# Patient Record
Sex: FEMALE
[De-identification: names, ages, dates, MRNs, and addresses within clinical notes are randomized per-mention and may not be internally consistent; named-entity substitution may affect disease eponyms.]

---

## 2017-02-06 ENCOUNTER — RX ONLY (OUTPATIENT)
Age: 32
Setting detail: RX ONLY
End: 2017-02-06

## 2017-02-06 RX ORDER — TRIAMCINOLONE ACETONIDE 0.25 MG/G
CREAM TOPICAL
Qty: 1 | Refills: 1 | Status: ERX

## 2017-10-17 ENCOUNTER — RX ONLY (OUTPATIENT)
Age: 32
Setting detail: RX ONLY
End: 2017-10-17

## 2017-10-17 RX ORDER — VALACYCLOVIR 500 MG/1
1 TABLET TABLET ORAL
Qty: 30 | Refills: 11 | Status: ERX

## 2017-11-22 ENCOUNTER — RX ONLY (OUTPATIENT)
Age: 32
Setting detail: RX ONLY
End: 2017-11-22

## 2017-11-22 RX ORDER — TRIAMCINOLONE ACETONIDE 0.25 MG/G
CREAM TOPICAL
Qty: 1 | Refills: 1 | Status: ERX

## 2018-04-23 ENCOUNTER — RX ONLY (OUTPATIENT)
Age: 33
Setting detail: RX ONLY
End: 2018-04-23

## 2018-04-23 RX ORDER — FLUCONAZOLE 150 MG/1
TABLET ORAL
Qty: 1 | Refills: 0 | Status: ERX | COMMUNITY
Start: 2018-04-23

## 2018-07-20 ENCOUNTER — RX ONLY (OUTPATIENT)
Age: 33
Setting detail: RX ONLY
End: 2018-07-20

## 2018-07-20 RX ORDER — VALACYCLOVIR 500 MG/1
TAKE ONE TABLET TABLET ORAL
Qty: 30 | Refills: 10 | Status: ERX

## 2018-11-28 ENCOUNTER — RX ONLY (OUTPATIENT)
Age: 33
Setting detail: RX ONLY
End: 2018-11-28

## 2018-11-28 RX ORDER — GLYCOPYRROLATE 1 MG/1
TABLET ORAL
Qty: 60 | Refills: 4 | Status: ERX | COMMUNITY
Start: 2018-11-28

## 2018-12-11 ENCOUNTER — RX ONLY (OUTPATIENT)
Age: 33
Setting detail: RX ONLY
End: 2018-12-11

## 2018-12-11 RX ORDER — NORGESTIMATE AND ETHINYL ESTRADIOL 7DAYSX3 LO
KIT ORAL
Qty: 84 | Refills: 3 | Status: ERX | COMMUNITY
Start: 2018-12-11

## 2019-02-27 ENCOUNTER — RX ONLY (OUTPATIENT)
Age: 34
Setting detail: RX ONLY
End: 2019-02-27

## 2019-02-27 RX ORDER — NORGESTIMATE AND ETHINYL ESTRADIOL 7DAYSX3 LO
KIT ORAL
Qty: 84 | Refills: 3 | Status: ERX | COMMUNITY
Start: 2019-02-27

## 2019-05-15 ENCOUNTER — RX ONLY (OUTPATIENT)
Age: 34
Setting detail: RX ONLY
End: 2019-05-15

## 2019-05-15 RX ORDER — FLUCONAZOLE 150 MG/1
TABLET ORAL
Qty: 1 | Refills: 1 | Status: ERX | COMMUNITY
Start: 2019-05-15

## 2019-07-13 ENCOUNTER — RX ONLY (OUTPATIENT)
Age: 34
Setting detail: RX ONLY
End: 2019-07-13

## 2019-07-13 RX ORDER — SPIRONOLACTONE 25 MG/1
TABLET, FILM COATED ORAL
Qty: 180 | Refills: 3 | Status: ERX | COMMUNITY
Start: 2019-07-13

## 2019-08-16 ENCOUNTER — RX ONLY (OUTPATIENT)
Age: 34
Setting detail: RX ONLY
End: 2019-08-16

## 2019-08-16 RX ORDER — FLUCONAZOLE 150 MG/1
TABLET ORAL
Qty: 1 | Refills: 1 | Status: ERX

## 2019-09-06 ENCOUNTER — RX ONLY (OUTPATIENT)
Age: 34
Setting detail: RX ONLY
End: 2019-09-06

## 2019-09-06 RX ORDER — VALACYCLOVIR 500 MG/1
TABLET ORAL
Qty: 30 | Refills: 11 | Status: ERX | COMMUNITY
Start: 2019-09-06

## 2019-11-29 ENCOUNTER — RX ONLY (OUTPATIENT)
Age: 34
Setting detail: RX ONLY
End: 2019-11-29

## 2019-11-29 RX ORDER — BIMATOPROST 0.3 MG/ML
SOLUTION/ DROPS OPHTHALMIC
Qty: 1 | Refills: 3 | Status: ERX | COMMUNITY
Start: 2019-11-29

## 2019-12-03 ENCOUNTER — RX ONLY (OUTPATIENT)
Age: 34
Setting detail: RX ONLY
End: 2019-12-03

## 2019-12-06 ENCOUNTER — RX ONLY (OUTPATIENT)
Age: 34
Setting detail: RX ONLY
End: 2019-12-06

## 2020-01-20 ENCOUNTER — APPOINTMENT (RX ONLY)
Dept: URBAN - METROPOLITAN AREA CLINIC 303 | Facility: CLINIC | Age: 35
Setting detail: DERMATOLOGY
End: 2020-01-20

## 2020-01-20 DIAGNOSIS — Z41.9 ENCOUNTER FOR PROCEDURE FOR PURPOSES OTHER THAN REMEDYING HEALTH STATE, UNSPECIFIED: ICD-10-CM

## 2020-01-20 PROCEDURE — ? FILLERS

## 2020-01-20 NOTE — PROCEDURE: FILLERS
Include Cannula Length?: 1.5 inch I have reviewed and confirmed nurses' notes for patient's medications, allergies, medical history, and surgical history.

## 2020-01-20 NOTE — PROCEDURE: FILLERS
Post-Care Instructions: Patient did well, very minimal swelling and no bruising noted post procedure.  Patient instructed to apply ice to reduce swelling, continue arnica, and avoid blood thinners listed in treatment guidelines.  To call me if she has any concerns.  Post HA filler instructions provided.  To f/u in 2 weeks.

## 2020-02-05 ENCOUNTER — RX ONLY (OUTPATIENT)
Age: 35
Setting detail: RX ONLY
End: 2020-02-05

## 2020-02-05 ENCOUNTER — APPOINTMENT (RX ONLY)
Dept: URBAN - METROPOLITAN AREA CLINIC 303 | Facility: CLINIC | Age: 35
Setting detail: DERMATOLOGY
End: 2020-02-05

## 2020-02-05 DIAGNOSIS — Z41.9 ENCOUNTER FOR PROCEDURE FOR PURPOSES OTHER THAN REMEDYING HEALTH STATE, UNSPECIFIED: ICD-10-CM

## 2020-02-05 ASSESSMENT — LOCATION SIMPLE DESCRIPTION DERM: LOCATION SIMPLE: LEFT CHEEK

## 2020-02-05 ASSESSMENT — LOCATION ZONE DERM: LOCATION ZONE: FACE

## 2020-02-05 ASSESSMENT — LOCATION DETAILED DESCRIPTION DERM: LOCATION DETAILED: LEFT MEDIAL MALAR CHEEK

## 2020-02-19 ENCOUNTER — RX ONLY (OUTPATIENT)
Age: 35
Setting detail: RX ONLY
End: 2020-02-19

## 2020-05-13 ENCOUNTER — RX ONLY (OUTPATIENT)
Age: 35
Setting detail: RX ONLY
End: 2020-05-13

## 2020-06-10 ENCOUNTER — RX ONLY (OUTPATIENT)
Age: 35
Setting detail: RX ONLY
End: 2020-06-10

## 2020-07-15 ENCOUNTER — RX ONLY (OUTPATIENT)
Age: 35
Setting detail: RX ONLY
End: 2020-07-15

## 2020-07-15 RX ORDER — FLUCONAZOLE 150 MG/1
TABLET ORAL
Qty: 1 | Refills: 1 | Status: ERX | COMMUNITY
Start: 2020-07-15

## 2020-07-15 RX ORDER — METRONIDAZOLE 500 MG/1
TABLET ORAL
Qty: 14 | Refills: 0 | Status: ERX | COMMUNITY
Start: 2020-07-15

## 2020-08-07 ENCOUNTER — APPOINTMENT (RX ONLY)
Dept: URBAN - METROPOLITAN AREA CLINIC 295 | Facility: CLINIC | Age: 35
Setting detail: DERMATOLOGY
End: 2020-08-07

## 2020-08-07 PROBLEM — Z01.84 ENCOUNTER FOR ANTIBODY RESPONSE EXAMINATION: Status: ACTIVE | Noted: 2020-08-07

## 2020-08-07 PROCEDURE — ? RAPID COVID-19 TEST

## 2020-08-07 PROCEDURE — U0002 COVID-19 LAB TEST NON-CDC: HCPCS

## 2020-08-07 NOTE — PROCEDURE: RAPID COVID-19 TEST
Test Performance Details: SARS-CoV antibody IgM and IgG are NEGATIVE.  This sample does not contain detectable SARS-CoV-2 IgM or IgG antibodies.  This negative result wade not rule out SARS-CoV-2 infection.  Correlation with epidemiologic risk factors and other clinical laboratory findings is recommended.  Serologic results should not be used as the sole basis to diagnose or exclude recent SARS-CoV-2 infection.

## 2020-08-07 NOTE — PROCEDURE: RAPID COVID-19 TEST
Comments: *This test has been validated and developed by [employer].  The FDA has been notified.\\n*This test has not yet been reviewed nor approved by the FDA.\\n*This test is authorized by FDA under an Emergency Use Authorization (EUA).  This test is only authorized for the duration of the declaration that circumstances exist justifying the authorization of emergency use of in vitro diagnostics for detection and/or diagnosis of COVID-19 under Section 564(b)(1) of the Act, 21 U.S.C. 360bbb-3 (b)(1), unless the authorization is terminated or revoked sooner.  This test has been authorized only for detecting the presence of antibodies against SARS-Cov-2, not for any other viruses or pathogens.\\n*Negative antibody result does not rule out SARS-CoV-2 infection.\\n*Follow-up testing with a molecular diagnostic test should be considered to rule out infection.\\n*This antibody test result should not be used as the sold basis to diagnose or exclude SARS-CoV-2 infection or to inform infection status.\\n Comments: *This test has been validated and developed by [employer].  The FDA has been notified.\\n*This test has not yet been reviewed nor approved by the FDA.\\n*This test is authorized by FDA under an Emergency Use Authorization (EUA).  This test is only authorized for the duration of the declaration that circumstances exist justifying the authorization of emergency use of in vitro diagnostics for detection and/or diagnosis of COVID-19 under Section 564(b)(1) of the Act, 21 U.S.C. 360bbb-3 (b)(1), unless the authorization is terminated or revoked sooner.  This test has been authorized only for detecting the presence of antibodies against SARS-Cov-2, not for any other viruses or pathogens.\\n*Negative antibody result does not rule out SARS-CoV-2 infection.\\n*Follow-up testing with a molecular diagnostic test should be considered to rule out infection.\\n*This antibody test result should not be used as the sold basis to diagnose or exclude SARS-CoV-2 infection or to inform infection status.\\n

## 2020-10-07 ENCOUNTER — RX ONLY (OUTPATIENT)
Age: 35
Setting detail: RX ONLY
End: 2020-10-07

## 2020-10-07 RX ORDER — METRONIDAZOLE 500 MG/1
TABLET ORAL
Qty: 14 | Refills: 0 | Status: ERX

## 2020-10-07 RX ORDER — FLUCONAZOLE 150 MG/1
TABLET ORAL
Qty: 1 | Refills: 1 | Status: ERX

## 2020-10-23 ENCOUNTER — RX ONLY (OUTPATIENT)
Age: 35
Setting detail: RX ONLY
End: 2020-10-23

## 2020-10-23 RX ORDER — VALACYCLOVIR 500 MG/1
TABLET, FILM COATED ORAL
Qty: 30 | Refills: 11 | Status: ERX | COMMUNITY
Start: 2020-10-23

## 2020-10-23 RX ORDER — METRONIDAZOLE 500 MG/1
TABLET ORAL
Qty: 14 | Refills: 0 | Status: ERX

## 2020-10-23 RX ORDER — GLYCOPYRROLATE 1 MG/1
TABLET ORAL
Qty: 60 | Refills: 4 | Status: ERX | COMMUNITY
Start: 2020-10-23

## 2020-10-23 RX ORDER — SPIRONOLACTONE 25 MG/1
TABLET, FILM COATED ORAL
Qty: 180 | Refills: 3 | Status: ERX

## 2020-12-22 ENCOUNTER — RX ONLY (OUTPATIENT)
Age: 35
Setting detail: RX ONLY
End: 2020-12-22

## 2020-12-22 RX ORDER — METRONIDAZOLE 500 MG/1
TABLET ORAL
Qty: 14 | Refills: 0 | Status: ERX

## 2021-01-13 ENCOUNTER — RX ONLY (OUTPATIENT)
Age: 36
Setting detail: RX ONLY
End: 2021-01-13

## 2021-01-13 ENCOUNTER — APPOINTMENT (RX ONLY)
Dept: URBAN - METROPOLITAN AREA CLINIC 295 | Facility: CLINIC | Age: 36
Setting detail: DERMATOLOGY
End: 2021-01-13

## 2021-01-13 RX ORDER — DROSPIRENONE AND ETHINYL ESTRADIOL 0.02-3(28)
KIT ORAL
Qty: 84 | Refills: 3 | Status: ERX | COMMUNITY
Start: 2021-01-13

## 2021-02-15 ENCOUNTER — RX ONLY (OUTPATIENT)
Age: 36
Setting detail: RX ONLY
End: 2021-02-15

## 2021-02-15 RX ORDER — METRONIDAZOLE 500 MG/1
TABLET ORAL
Qty: 14 | Refills: 0 | Status: ERX

## 2021-02-16 ENCOUNTER — RX ONLY (OUTPATIENT)
Age: 36
Setting detail: RX ONLY
End: 2021-02-16

## 2021-02-16 RX ORDER — GLYCOPYRROLATE 1 MG/1
TABLET ORAL
Qty: 180 | Refills: 1 | Status: ERX

## 2021-02-24 ENCOUNTER — RX ONLY (OUTPATIENT)
Age: 36
Setting detail: RX ONLY
End: 2021-02-24

## 2021-02-24 RX ORDER — FLUCONAZOLE 150 MG/1
TABLET ORAL
Qty: 1 | Refills: 1 | Status: ERX

## 2021-03-30 ENCOUNTER — RX ONLY (OUTPATIENT)
Age: 36
Setting detail: RX ONLY
End: 2021-03-30

## 2021-03-30 RX ORDER — TRIAMCINOLONE ACETONIDE 0.25 MG/G
OINTMENT TOPICAL
Qty: 1 | Refills: 0 | Status: ERX | COMMUNITY
Start: 2021-03-30

## 2021-03-30 RX ORDER — METRONIDAZOLE 500 MG/1
TABLET ORAL
Qty: 14 | Refills: 0 | Status: ERX

## 2021-05-14 ENCOUNTER — RX ONLY (OUTPATIENT)
Age: 36
Setting detail: RX ONLY
End: 2021-05-14

## 2021-05-14 RX ORDER — FLUCONAZOLE 150 MG/1
TABLET ORAL
Qty: 1 | Refills: 1 | Status: ERX

## 2021-05-14 RX ORDER — METRONIDAZOLE 500 MG/1
TABLET ORAL
Qty: 14 | Refills: 0 | Status: ERX

## 2021-10-27 ENCOUNTER — RX ONLY (OUTPATIENT)
Age: 36
Setting detail: RX ONLY
End: 2021-10-27

## 2021-10-27 RX ORDER — SPIRONOLACTONE 100 MG/1
TABLET, FILM COATED ORAL
Qty: 60 | Refills: 0 | Status: CANCELLED

## 2022-03-10 ENCOUNTER — RX ONLY (OUTPATIENT)
Age: 37
Setting detail: RX ONLY
End: 2022-03-10

## 2022-03-10 RX ORDER — DROSPIRENONE AND ETHINYL ESTRADIOL 0.02-3(28)
KIT ORAL
Qty: 84 | Refills: 3 | Status: ERX | COMMUNITY
Start: 2022-03-10

## 2022-03-10 RX ORDER — VALACYCLOVIR 500 MG/1
TABLET, FILM COATED ORAL
Qty: 30 | Refills: 11 | Status: ERX | COMMUNITY
Start: 2022-03-10

## 2022-03-12 ENCOUNTER — APPOINTMENT (RX ONLY)
Dept: URBAN - METROPOLITAN AREA CLINIC 295 | Facility: CLINIC | Age: 37
Setting detail: DERMATOLOGY
End: 2022-03-12

## 2022-03-12 DIAGNOSIS — B07.8 OTHER VIRAL WARTS: ICD-10-CM

## 2022-03-12 PROCEDURE — ? SHAVE REMOVAL (NO PATHOLOGY)

## 2022-03-12 ASSESSMENT — LOCATION ZONE DERM: LOCATION ZONE: FINGER

## 2022-03-12 ASSESSMENT — LOCATION SIMPLE DESCRIPTION DERM: LOCATION SIMPLE: RIGHT MIDDLE FINGER

## 2022-03-12 ASSESSMENT — LOCATION DETAILED DESCRIPTION DERM
LOCATION DETAILED: RIGHT PROXIMAL PALMAR MIDDLE FINGER
LOCATION DETAILED: RIGHT DISTAL PALMAR MIDDLE FINGER

## 2022-03-12 NOTE — PROCEDURE: SHAVE REMOVAL (NO PATHOLOGY)
Medical Necessity Information: It is in your best interest to select a reason for this procedure from the list below. All of these items fulfill various CMS LCD requirements except the new and changing color options.
Include Z78.9 (Other Specified Conditions Influencing Health Status) As An Associated Diagnosis?: No
Medical Necessity Clause: This procedure was medically necessary because the lesion that was treated was:inflamed
Detail Level: Detailed
Size Of Lesion In Cm: 0.5
X Size Of Lesion In Cm (Optional): 0
Anesthesia Type: 1% lidocaine with epinephrine
Hemostasis: Cameron's
Wound Care: Petrolatum
Consent was obtained from the patient. The risks and benefits to therapy were discussed in detail. Specifically, the risks of infection, scarring, bleeding, prolonged wound healing, incomplete removal, allergy to anesthesia, nerve injury and recurrence were addressed. Prior to the procedure, the treatment site was clearly identified and confirmed by the patient. All components of Universal Protocol/PAUSE Rule completed.
Post-Care Instructions: I reviewed with the patient in detail post-care instructions. Patient is to keep the biopsy site dry overnight, and then apply bacitracin twice daily until healed. Patient may apply hydrogen peroxide soaks to remove any crusting.
Size Of Lesion In Cm: 0.9
Size Of Lesion In Cm: 0.7

## 2022-11-15 ENCOUNTER — APPOINTMENT (RX ONLY)
Dept: URBAN - METROPOLITAN AREA CLINIC 295 | Facility: CLINIC | Age: 37
Setting detail: DERMATOLOGY
End: 2022-11-15

## 2022-11-15 DIAGNOSIS — Z41.9 ENCOUNTER FOR PROCEDURE FOR PURPOSES OTHER THAN REMEDYING HEALTH STATE, UNSPECIFIED: ICD-10-CM

## 2022-11-15 PROCEDURE — ? DYSPORT

## 2022-11-15 NOTE — PROCEDURE: DYSPORT
Additional Area 1 Units: 0
Show Right And Left Periorbital Units: No
Show Additional Area 5: Yes
Consent: Written consent obtained. Risks include but not limited to lid/brow ptosis, bruising, swelling, diplopia, temporary effect, incomplete chemical denervation.
Post-Care Instructions: Patient instructed to not lie down for 4 hours and limit physical activity for 24 hours.
Detail Level: Detailed
Dilution (U/ 0.1cc): 10